# Patient Record
Sex: FEMALE | Race: WHITE | Employment: STUDENT | URBAN - METROPOLITAN AREA
[De-identification: names, ages, dates, MRNs, and addresses within clinical notes are randomized per-mention and may not be internally consistent; named-entity substitution may affect disease eponyms.]

---
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
## 2020-06-18 DIAGNOSIS — Z13.9 SCREENING FOR UNSPECIFIED CONDITION: Primary | ICD-10-CM

## 2020-06-18 PROCEDURE — 86481 TB AG RESPONSE T-CELL SUSP: CPT | Performed by: PEDIATRICS

## 2020-06-18 PROCEDURE — 36415 COLL VENOUS BLD VENIPUNCTURE: CPT | Performed by: PEDIATRICS

## 2020-06-19 LAB
GAMMA INTERFERON BACKGROUND BLD IA-ACNC: 0.02 IU/ML
M TB IFN-G BLD-IMP: NEGATIVE
M TB IFN-G CD4+ BCKGRND COR BLD-ACNC: >10 IU/ML
MITOGEN IGNF BCKGRD COR BLD-ACNC: 0 IU/ML
MITOGEN IGNF BCKGRD COR BLD-ACNC: 0 IU/ML

## 2020-11-07 ENCOUNTER — HEALTH MAINTENANCE LETTER (OUTPATIENT)
Age: 20
End: 2020-11-07

## 2021-09-05 ENCOUNTER — HEALTH MAINTENANCE LETTER (OUTPATIENT)
Age: 21
End: 2021-09-05

## 2021-12-26 ENCOUNTER — HEALTH MAINTENANCE LETTER (OUTPATIENT)
Age: 21
End: 2021-12-26

## 2022-10-23 ENCOUNTER — HEALTH MAINTENANCE LETTER (OUTPATIENT)
Age: 22
End: 2022-10-23

## 2023-04-02 ENCOUNTER — HEALTH MAINTENANCE LETTER (OUTPATIENT)
Age: 23
End: 2023-04-02

## 2023-06-12 NOTE — ED PROVIDER NOTES
"  History     Chief Complaint:  Abdominal Pain      HPI   Lorin Crain is a 17 year old female who presents to the emergency department today for evaluation of abdominal pain. The patient reports that she began having mid epigastric three days ago. It resolved, however, returned this morning at 0900 and has been worsening since 2000 tonight and wrapping around to her back; therefore, she presents to the emergency department for evaluation. She took ibuprofen at approximately 2215 with minimal relief. Pain is not worse with PO. She denies fever, nausea, vomiting, diarrhea, urinary or bowel irregularities. Of note, the patient is currently on her menstrual period. The patient's mother adds that she \"puts a lot of pressure on herself.\"    Allergies:  No Known Drug Allergies    Medications:    The patient is currently on no regular medications.    Past Medical History:    History reviewed. No pertinent past medical history.    Past Surgical History:    History reviewed. No pertinent surgical history.    Family History:    History reviewed. No pertinent family history.     Social History:  The patient was accompanied to the ED by her mother.  Marital Status:  Single     Review of Systems   Constitutional: Negative for fever.   Gastrointestinal: Positive for abdominal pain. Negative for diarrhea, nausea and vomiting.   Genitourinary: Negative.    Musculoskeletal: Positive for back pain.   All other systems reviewed and are negative.    Physical Exam     Patient Vitals for the past 24 hrs:   BP Temp Temp src Heart Rate SpO2 Weight   12/12/17 0045 123/83 - - - - -   12/12/17 0033 122/77 - - - 100 % -   12/12/17 0000 128/93 - - - 100 % -   12/11/17 2353 128/99 - - - 100 % -   12/11/17 2325 139/100 - - - - -   12/11/17 2322 - 96.5  F (35.8  C) Temporal 74 99 % 61.2 kg (135 lb)       Physical Exam  Nursing note and vitals reviewed.  Constitutional: Cooperative.   HENT:   Mouth/Throat: Mucous membranes are normal. "   Cardiovascular: Normal rate, regular rhythm and normal heart sounds.  No murmur.  Pulmonary/Chest: Effort normal and breath sounds normal. No respiratory distress. No wheezes.   Abdominal: Soft. Normal appearance and bowel sounds are normal. No distension. Epigastric tenderness. There is no rigidity and no guarding.   Neurological: Alert.   Skin: Skin is warm and dry. No rash noted.   Psychiatric: Normal mood and affect.     Emergency Department Course     Imaging:  Radiology findings were communicated with the patient who voiced understanding of the findings.  US Abdomen limited  IMPRESSION: No acute sonographic findings in the right upper quadrant.  Report per radiology      Laboratory:  Laboratory findings were communicated with the patient who voiced understanding of the findings.    CBC: AWNL. (WBC 8.9, HGB 13.5, )   CMP: Potassium 3.3 (L), Glucose 101 (H), BUN 6 (L), o/w WNL (Creatinine 0.61)  Lipase: 174    HCG Qualitative Serum: Negative     Interventions:  2352 GI Cocktail 30mg PO     Emergency Department Course:  Nursing notes and vitals reviewed.    2335: I performed an exam of the patient as documented above.     0047: Patient rechecked and updated. Patient sleeping. Does not know if GI cocktail provided any relief.    0058: Patient rechecked and updated. Patient has tracked menstrual periods with the vomiting and diarrhea and states there is no correlation.    Findings and plan explained to the Patient. Patient discharged home with instructions regarding supportive care, medications, and reasons to return. The importance of close follow-up was reviewed. The patient was prescribed Protonix and Carafate.   I personally reviewed the laboratory and imaging results with the Patient and answered all related questions prior to discharge.    Impression & Plan      Medical Decision Making:  Lorin Crain is a 17 year old otherwise healthy female who presents with epigastric abdominal pain radiating  to the back. She has tenderness in her epigastrium but a nonsurgical abdomen. She is not pregnant and labs are reassuring. RUQ ultrasound shows no evidence of biliary pathology. Liver and pancreatic enzymes are normal. On recheck she is sleeping. She is unsure whether the GI cocktail helped much but her signs and symptoms are most consistent with either gastritis or peptic ulcer disease, no concern for bowel obstruction or perforation clinically. She has no tenderness below her umbilicus making ovarian pathology or appendicitis unlikely. No diarrheal symptoms to be concerned for colitis. No findings concerning for thoracic etiology such as PE. At this time recommend proton pump inhibitor, Carafate as needed and primary care follow up.    Diagnosis:    ICD-10-CM    1. Epigastric abdominal pain R10.13        Disposition:  discharged to home    Discharge Medications:  New Prescriptions    PANTOPRAZOLE (PROTONIX) 40 MG EC TABLET    Take 1 tablet (40 mg) by mouth daily for 14 doses    SUCRALFATE (CARAFATE) 1 GM/10ML SUSPENSION    Take 10 mLs (1 g) by mouth 4 times daily       Scribe Disclosure:  Galilea STROUD, am serving as a scribe at 11:29 PM on 12/11/2017 to document services personally performed by Eligio Vallejo MD based on my observations and the provider's statements to me.    12/11/2017   [facility] EMERGENCY DEPARTMENT       Eligio Vallejo MD  12/12/17 0109     6